# Patient Record
Sex: MALE | Employment: FULL TIME | URBAN - NONMETROPOLITAN AREA
[De-identification: names, ages, dates, MRNs, and addresses within clinical notes are randomized per-mention and may not be internally consistent; named-entity substitution may affect disease eponyms.]

---

## 2021-04-14 ENCOUNTER — OFFICE VISIT (OUTPATIENT)
Dept: FAMILY MEDICINE CLINIC | Age: 21
End: 2021-04-14
Payer: COMMERCIAL

## 2021-04-14 VITALS
WEIGHT: 174.3 LBS | HEART RATE: 98 BPM | OXYGEN SATURATION: 97 % | HEIGHT: 69 IN | DIASTOLIC BLOOD PRESSURE: 78 MMHG | SYSTOLIC BLOOD PRESSURE: 120 MMHG | BODY MASS INDEX: 25.82 KG/M2

## 2021-04-14 DIAGNOSIS — Z72.0 TOBACCO USE: ICD-10-CM

## 2021-04-14 DIAGNOSIS — L73.9 FOLLICULITIS: ICD-10-CM

## 2021-04-14 DIAGNOSIS — J45.20 MILD INTERMITTENT ASTHMA, UNSPECIFIED WHETHER COMPLICATED: Primary | ICD-10-CM

## 2021-04-14 PROCEDURE — 99203 OFFICE O/P NEW LOW 30 MIN: CPT | Performed by: FAMILY MEDICINE

## 2021-04-14 RX ORDER — AZITHROMYCIN 250 MG/1
TABLET, FILM COATED ORAL
Qty: 1 PACKET | Refills: 0 | Status: SHIPPED | OUTPATIENT
Start: 2021-04-14 | End: 2021-08-31 | Stop reason: ALTCHOICE

## 2021-04-14 RX ORDER — FLUTICASONE FUROATE AND VILANTEROL TRIFENATATE 100; 25 UG/1; UG/1
1 POWDER RESPIRATORY (INHALATION) DAILY
Qty: 60 EACH | Refills: 2 | Status: SHIPPED | OUTPATIENT
Start: 2021-04-14 | End: 2021-08-31 | Stop reason: SDUPTHER

## 2021-04-14 SDOH — ECONOMIC STABILITY: TRANSPORTATION INSECURITY
IN THE PAST 12 MONTHS, HAS LACK OF TRANSPORTATION KEPT YOU FROM MEETINGS, WORK, OR FROM GETTING THINGS NEEDED FOR DAILY LIVING?: NO

## 2021-04-14 SDOH — ECONOMIC STABILITY: FOOD INSECURITY: WITHIN THE PAST 12 MONTHS, THE FOOD YOU BOUGHT JUST DIDN'T LAST AND YOU DIDN'T HAVE MONEY TO GET MORE.: NEVER TRUE

## 2021-04-14 SDOH — ECONOMIC STABILITY: INCOME INSECURITY: HOW HARD IS IT FOR YOU TO PAY FOR THE VERY BASICS LIKE FOOD, HOUSING, MEDICAL CARE, AND HEATING?: NOT HARD AT ALL

## 2021-04-14 SDOH — ECONOMIC STABILITY: FOOD INSECURITY: WITHIN THE PAST 12 MONTHS, YOU WORRIED THAT YOUR FOOD WOULD RUN OUT BEFORE YOU GOT MONEY TO BUY MORE.: NEVER TRUE

## 2021-04-14 ASSESSMENT — ENCOUNTER SYMPTOMS
SHORTNESS OF BREATH: 1
ABDOMINAL PAIN: 0
COUGH: 0
SINUS PAIN: 0
NAUSEA: 0
VOMITING: 0

## 2021-04-14 ASSESSMENT — PATIENT HEALTH QUESTIONNAIRE - PHQ9
SUM OF ALL RESPONSES TO PHQ QUESTIONS 1-9: 2
2. FEELING DOWN, DEPRESSED OR HOPELESS: 1
SUM OF ALL RESPONSES TO PHQ QUESTIONS 1-9: 2

## 2021-04-14 NOTE — PROGRESS NOTES
[address] [address]  Dept: [phone]  Dept Fax: [phone]    Lani Llanos is a 22 y.o. male who presents today for his medical conditions/complaints as noted below. Lani Llanos c/o of Establish Care      HPI:     HPI  Pt here to establish care. Not previously seen within the health system  Rash reported since beginning job at [employer] noted today  Red dots noted with some burning and itching also  Occasional chest discomfort, anxiety and headaches- more with breathing issues    At times SOB when just sitting. More past week. Prior history asthma as child - no recent medication. Doing more physical work now, at position past months. No inhaler since 18 yo per pt in juvenile care home    Pt is daily smoker. More with stresses since 16 yo      BP Readings from Last 3 Encounters:   04/14/21 120/78          (goal 120/80)    Past Medical History:   Diagnosis Date    Asthma     Seizures ([address]       History reviewed. No pertinent surgical history. History reviewed. No pertinent family history.     Social History     Tobacco Use    Smoking status: Current Every Day Smoker     Types: Cigarettes    Smokeless tobacco: Never Used   Substance Use Topics    Alcohol use: Not on file      Prior to Visit Medications    Not on File     No Known Allergies    Health Maintenance   Topic Date Due    Hepatitis C screen  Never done    Varicella vaccine (1 of 2 - 2-dose childhood series) Never done    HPV vaccine (1 - Male 2-dose series) Never done    HIV screen  Never done    COVID-19 Vaccine (1) Never done    DTaP/Tdap/Td vaccine (1 - Tdap) Never done    Flu vaccine (Season Ended) 09/01/2021    Hepatitis A vaccine  Aged Out    Hepatitis B vaccine  Aged Out    Hib vaccine  Aged Out    Meningococcal (ACWY) vaccine  Aged Out    Pneumococcal 0-64 years Vaccine  Aged Out       Subjective:      Review of Systems   Constitutional: Negative for activity change, appetite change and fatigue. HENT: Negative for congestion and sinus pain. Eyes: Negative for visual disturbance. Respiratory: Positive for shortness of breath (when he is just sitting). Negative for cough. Cardiovascular: Positive for chest pain. Negative for palpitations and leg swelling. Gastrointestinal: Negative for abdominal pain, nausea and vomiting. Genitourinary: Negative for dysuria and frequency. Musculoskeletal: Negative for arthralgias and myalgias. Skin: Positive for rash (started at [facility] on march 23 and since that has red dots that have appeared on his arm and legs, it itches and burns. ). Neurological: Positive for headaches. Negative for dizziness. Psychiatric/Behavioral: Negative for confusion and sleep disturbance. The patient is nervous/anxious. Objective:     /78 (Site: Right Upper Arm, Position: Sitting, Cuff Size: Small Adult)   Pulse 98   Ht 5' 9.49\" (1.765 m)   Wt 174 lb 4.8 oz (79.1 kg)   SpO2 97%   BMI 25.38 kg/m²     Physical Exam  Vitals signs reviewed. Constitutional:       General: He is not in acute distress. Appearance: He is well-developed. He is not ill-appearing. HENT:      Head: Atraumatic. Eyes:      Conjunctiva/sclera: Conjunctivae normal.   Neck:      Musculoskeletal: Neck supple. Thyroid: No thyromegaly. Vascular: No carotid bruit. Cardiovascular:      Rate and Rhythm: Normal rate and regular rhythm. Heart sounds: No murmur. Pulmonary:      Effort: Pulmonary effort is normal.      Breath sounds: Normal breath sounds. Decreased air movement (throughout) present. No wheezing or rhonchi. Musculoskeletal:         General: No swelling (BLE). Right lower leg: He exhibits no swelling. Left lower leg: He exhibits no swelling. Lymphadenopathy:      Cervical: No cervical adenopathy. Neurological:      Mental Status: He is alert and oriented to person, place, and time. Psychiatric:         Judgment: Judgment normal.         Assessment:     1. Mild intermittent asthma, unspecified whether complicated    2. Folliculitis    3. Tobacco use      No results found for this visit on 04/14/21. Plan:   No orders of the defined types were placed in this encounter. Return in about 6 months (around 10/14/2021) for asthma. Patient Instructions   Stop smoking encouraged       Discussed use, benefit, and side effects of prescribed medications. All patient questions answered. Pt voiced understanding. Instructed to continue current medications, diet and exercise. Patient agreed with treatment plan. Follow up as directed.      Electronically signed by Edward Diallo MD on 4/14/2021

## 2021-04-21 ENCOUNTER — OFFICE VISIT (OUTPATIENT)
Dept: FAMILY MEDICINE CLINIC | Age: 21
End: 2021-04-21
Payer: COMMERCIAL

## 2021-04-21 VITALS
WEIGHT: 176 LBS | SYSTOLIC BLOOD PRESSURE: 102 MMHG | DIASTOLIC BLOOD PRESSURE: 70 MMHG | BODY MASS INDEX: 25.2 KG/M2 | HEART RATE: 67 BPM | HEIGHT: 70 IN | OXYGEN SATURATION: 97 %

## 2021-04-21 DIAGNOSIS — R74.8 ELEVATED CK: ICD-10-CM

## 2021-04-21 DIAGNOSIS — R06.02 SOB (SHORTNESS OF BREATH): Primary | ICD-10-CM

## 2021-04-21 DIAGNOSIS — J45.20 MILD INTERMITTENT ASTHMA, UNSPECIFIED WHETHER COMPLICATED: ICD-10-CM

## 2021-04-21 PROCEDURE — 99213 OFFICE O/P EST LOW 20 MIN: CPT | Performed by: FAMILY MEDICINE

## 2021-04-21 RX ORDER — ALBUTEROL SULFATE 90 UG/1
2 AEROSOL, METERED RESPIRATORY (INHALATION) 4 TIMES DAILY PRN
Qty: 1 INHALER | Refills: 1 | Status: SHIPPED | OUTPATIENT
Start: 2021-04-21 | End: 2022-05-09 | Stop reason: SDUPTHER

## 2021-04-21 RX ORDER — LORATADINE 10 MG/1
10 TABLET ORAL DAILY
Qty: 30 TABLET | Refills: 1 | Status: SHIPPED | OUTPATIENT
Start: 2021-04-21 | End: 2021-05-21

## 2021-04-21 ASSESSMENT — ENCOUNTER SYMPTOMS
DIARRHEA: 0
VOMITING: 0
WHEEZING: 0
SHORTNESS OF BREATH: 0
ABDOMINAL PAIN: 0

## 2021-04-21 NOTE — PROGRESS NOTES
[address] [address]  Dept: [phone]  Dept Fax: [phone]    Gonzalo Sanchez is a 22 y.o. male who presents today for his medical conditions/complaints as noted below. Gonzalo Sanchez c/o of Follow-Up from [facility] 4/16/21 SOB)      HPI:     HPI  Pt here post ER evaluation 4/16/21 had severe shortness of breath and was taken to squad from work. He had left hospital AGAINST MEDICAL ADVICE at that time. Patient reports he was at work in the [address] [place] [address] [employer] when his chest began to hurt and had more shortness of breath. He does state that when he is in that department he has had problems in the past. When he does not work at that department he has no symptoms. Patient reports the rash was also noted that he had previously gotten when he was in the same department. Patient is continuing on his antibiotic as prior. He did have allergy testing accomplished in 2016 while in foster care but was told this was all negative at that time. BP Readings from Last 3 Encounters:   04/21/21 102/70   04/14/21 120/78          (goal 120/80)    Past Medical History:   Diagnosis Date    Asthma     Seizures ([address]       No past surgical history on file. No family history on file. Social History     Tobacco Use    Smoking status: Current Every Day Smoker     Types: Cigarettes    Smokeless tobacco: Never Used   Substance Use Topics    Alcohol use: Not on file      Prior to Visit Medications    Medication Sig Taking? Authorizing Provider   fluticasone-vilanterol (BREO ELLIPTA) 100-25 MCG/INH AEPB inhaler Inhale 1 puff into the lungs daily  Nick Alberto MD   azithromycin (ZITHROMAX) 250 MG tablet Take 2 tabs (500 mg) on Day 1, and take 1 tab (250 mg) on days 2 through 5.   Nick Alberto MD     No Known Allergies    Health Maintenance   Topic Date Due    Hepatitis C screen  Never done    Varicella vaccine (1 of 2 - 2-dose childhood series) Never done    Pneumococcal 0-64 years Vaccine (1 of 1 - PPSV23) Never done    HPV vaccine (1 - Male 2-dose series) Never done    HIV screen  Never done    COVID-19 Vaccine (1) Never done    DTaP/Tdap/Td vaccine (1 - Tdap) Never done    Flu vaccine (Season Ended) 09/01/2021    Hepatitis A vaccine  Aged Out    Hepatitis B vaccine  Aged Out    Hib vaccine  Aged Out    Meningococcal (ACWY) vaccine  Aged Out       Subjective:      Review of Systems   Constitutional: Negative for fatigue and fever. Pt at [facility] on 4/16/21 for SOB, taken by squad from work. Pt left [facility] AMA. Was at work in the [address] [facility] when his chest started to hurt and had SOB. States when he is not in that department at work he doesn't have any symptoms. This is also the area he was in when he started to get the rash. Still on antibiotic. Had allergy testing done in 2016 when he was in foster care but everything was negative. Respiratory: Negative for shortness of breath and wheezing. Cardiovascular: Negative for chest pain. Gastrointestinal: Negative for abdominal pain, diarrhea and vomiting. Neurological: Negative for dizziness. Objective:     /70 (Site: Left Upper Arm, Position: Sitting, Cuff Size: Small Adult)   Pulse 67   Ht 5' 9.5\" (1.765 m)   Wt 176 lb (79.8 kg)   SpO2 97%   BMI 25.62 kg/m²     Physical Exam  Constitutional:       General: He is not in acute distress. Appearance: He is not ill-appearing or toxic-appearing. HENT:      Head: Normocephalic. Eyes:      Conjunctiva/sclera: Conjunctivae normal.   Cardiovascular:      Rate and Rhythm: Normal rate and regular rhythm. Heart sounds: No murmur. Pulmonary:      Effort: Pulmonary effort is normal.      Breath sounds: Wheezing (slight inspiratory working) present. Musculoskeletal:         General: No swelling. Skin:     Findings: No rash. Neurological:      Mental Status: He is alert.    Psychiatric: Behavior: Behavior normal.         Thought Content: Thought content normal.       ER note reviewed pt left AMA when tired of waiting for labs to return. Labs reviewed with only abnormality for elevated CK level over 400 with normal near 100       Assessment:     1. SOB (shortness of breath)    2. Elevated CK    3. Mild intermittent asthma, unspecified whether complicated      No results found for this visit on 04/21/21. Plan:     Orders Placed This Encounter   Procedures    Creatinine, Serum     Standing Status:   Future     Standing Expiration Date:   4/21/2022    CK isoenzymes     Standing Status:   Future     Standing Expiration Date:   4/21/2022    External Referral To Allergy     Referral Priority:   Routine     Referral Type:   Eval and Treat     Referral Reason:   Specialty Services Required     Requested Specialty:   Allergy     Number of Visits Requested:   1           No follow-ups on file. except with specialist      There are no Patient Instructions on file for this visit. Discussed use, benefit, and side effects of prescribed medications. All patient questions answered. Pt voiced understanding. Reviewed health maintenance. Instructed to continue current medications, diet and exercise. Patient agreed with treatment plan. Follow up as directed.      Electronically signed by Rommel Cha MD on 4/21/2021

## 2021-04-27 ENCOUNTER — TELEPHONE (OUTPATIENT)
Dept: FAMILY MEDICINE CLINIC | Age: 21
End: 2021-04-27

## 2021-08-31 ENCOUNTER — OFFICE VISIT (OUTPATIENT)
Dept: FAMILY MEDICINE CLINIC | Age: 21
End: 2021-08-31
Payer: COMMERCIAL

## 2021-08-31 VITALS
BODY MASS INDEX: 25.2 KG/M2 | HEIGHT: 70 IN | DIASTOLIC BLOOD PRESSURE: 76 MMHG | SYSTOLIC BLOOD PRESSURE: 110 MMHG | HEART RATE: 78 BPM | RESPIRATION RATE: 20 BRPM | WEIGHT: 176 LBS | OXYGEN SATURATION: 98 %

## 2021-08-31 DIAGNOSIS — R06.02 SOB (SHORTNESS OF BREATH): Primary | ICD-10-CM

## 2021-08-31 DIAGNOSIS — J45.20 MILD INTERMITTENT ASTHMA, UNSPECIFIED WHETHER COMPLICATED: ICD-10-CM

## 2021-08-31 DIAGNOSIS — Z72.0 TOBACCO USE: ICD-10-CM

## 2021-08-31 LAB
EXPIRATORY TIME: NORMAL
FEF 25-75% %PRED-PRE: NORMAL
FEF 25-75% PRED: NORMAL
FEF 25-75%-PRE: NORMAL
FEV1 %PRED-PRE: 96 %
FEV1 PRED: NORMAL
FEV1/FVC %PRED-PRE: NORMAL
FEV1/FVC PRED: NORMAL
FEV1/FVC: 104 %
FEV1: NORMAL
FVC %PRED-PRE: NORMAL
FVC PRED: NORMAL
FVC: NORMAL
PEF %PRED-PRE: NORMAL
PEF PRED: NORMAL
PEF-PRE: NORMAL

## 2021-08-31 PROCEDURE — 99213 OFFICE O/P EST LOW 20 MIN: CPT | Performed by: FAMILY MEDICINE

## 2021-08-31 PROCEDURE — 94010 BREATHING CAPACITY TEST: CPT | Performed by: FAMILY MEDICINE

## 2021-08-31 RX ORDER — FLUTICASONE FUROATE AND VILANTEROL TRIFENATATE 100; 25 UG/1; UG/1
1 POWDER RESPIRATORY (INHALATION) DAILY
Qty: 60 EACH | Refills: 2 | Status: SHIPPED | OUTPATIENT
Start: 2021-08-31 | End: 2022-05-09 | Stop reason: SDUPTHER

## 2021-08-31 ASSESSMENT — PULMONARY FUNCTION TESTS
FEV1/FVC: 104
FEV1_PERCENT_PREDICTED_PRE: 96

## 2021-08-31 ASSESSMENT — ENCOUNTER SYMPTOMS
SHORTNESS OF BREATH: 0
CHEST TIGHTNESS: 0

## 2021-08-31 NOTE — LETTER
2021 [facility] department of [address]  Phone: [phone]    Sulaiman Maki MD        August 31, 2021     Patient: Wang Harper   YOB: 2000   Date of Visit: 8/31/2021       To Whom it May Concern:    Jo Anderson was seen in my clinic on 8/31/2021. He may return to work on 8/31/21. If you have any questions or concerns, please don't hesitate to call.     Sincerely,         Sulaiman Maki MD

## 2021-08-31 NOTE — PROGRESS NOTES
[address] [address]  Dept: [phone]  Dept Fax: [phone]    Doc John is a 22 y.o. male who presents today for his medical conditions/complaints as noted below. Tanner John c/o of Annual Exam      HPI:     HPI  Pt here requesting clearance to return to work. Had couple episodes of SOB and \"passing out\" noted in March and April. Denies any issues since then. Did not keep follow up with allergist as requested at April appointment. No showed for appointment. Had incarceration prior to allergist.    Used inhaler until lost last month. No medications currently for asthma noted. 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 BP Readings from Last 3 Encounters:   08/31/21 110/76   04/21/21 102/70   04/14/21 120/78          (goal 120/80)    Past Medical History:   Diagnosis Date    Asthma     [facility] ([facility] [address] [facility]       No past surgical history on file. No family history on file. Social History     Tobacco Use    Smoking status: Current Every Day Smoker     Types: Cigarettes    Smokeless tobacco: Never Used   Substance Use Topics    Alcohol use: Not on file      Prior to Visit Medications    Medication Sig Taking? Authorizing Provider   albuterol sulfate HFA (VENTOLIN HFA) 108 (90 Base) MCG/ACT inhaler Inhale 2 puffs into the lungs 4 times daily as needed for Wheezing Yes rFanklyn Henning MD   fluticasone-vilanterol (BREO ELLIPTA) 100-25 MCG/INH AEPB inhaler Inhale 1 puff into the lungs daily Yes Franklyn Henning MD   azithromycin (ZITHROMAX) 250 MG tablet Take 2 tabs (500 mg) on Day 1, and take 1 tab (250 mg) on days 2 through 5.   Patient not taking: Reported on 8/31/2021  Franklyn Henning MD     No Known Allergies    Health Maintenance   Topic Date Due    Hepatitis C screen  Never done    Varicella vaccine (1 of 2 - 2-dose childhood series) Never done    Pneumococcal 0-64 years Vaccine (1 of 2 - PPSV23) Never done    HPV vaccine (1 - Male 2-dose series) Never done    COVID-19 Vaccine (1) Never done    HIV screen  Never done    DTaP/Tdap/Td vaccine (1 - Tdap) Never done    Flu vaccine (1) 09/01/2021    Hepatitis A vaccine  Aged Out    Hepatitis B vaccine  Aged Out    Hib vaccine  Aged Out    Meningococcal (ACWY) vaccine  Aged Out       Subjective:      Review of Systems   Constitutional: Negative for fatigue. Needs cleared to go back to work, passed out twice at work in April   Respiratory: Negative for chest tightness and shortness of breath. Cardiovascular: Negative for chest pain. Neurological: Negative for dizziness, light-headedness and headaches. Objective:     /76 (Site: Left Upper Arm, Position: Sitting, Cuff Size: Large Adult)   Pulse 78   Resp 20   Ht 5' 9.5\" (1.765 m)   Wt 176 lb (79.8 kg)   SpO2 98%   BMI 25.62 kg/m²     Physical Exam  Vitals reviewed. Constitutional:       General: He is not in acute distress. Appearance: He is well-developed. HENT:      Head: Atraumatic. Eyes:      Conjunctiva/sclera: Conjunctivae normal.   Neck:      Thyroid: No thyromegaly. Vascular: No carotid bruit. Cardiovascular:      Rate and Rhythm: Normal rate and regular rhythm. Heart sounds: No murmur heard. Pulmonary:      Effort: Pulmonary effort is normal.      Breath sounds: Normal breath sounds. Decreased air movement (throughout) present. No wheezing. Abdominal:      General: Bowel sounds are normal.      Palpations: Abdomen is soft. Musculoskeletal:         General: No swelling (BLE). Cervical back: Neck supple. Right lower leg: No swelling. Left lower leg: No swelling. Lymphadenopathy:      Cervical: No cervical adenopathy.    Neurological:      Mental Status: He is alert and oriented to person, place, and time.         Assessment:     1. SOB (shortness of breath)    2. Tobacco use    3. Mild intermittent asthma, unspecified whether complicated      No results found for this visit on 08/31/21. Plan:     Orders Placed This Encounter   Procedures    Bronchial Challenge     Standing Status:   Future     Standing Expiration Date:   8/31/2022    Spirometry Without Bronchodilator           No follow-ups on file. Patient Instructions   Encourage stop smoking  Need for methylcholine challenge to test to see if patient needs ongoing treatment for asthma to be able to return to work. Discussed use, benefit, and side effects of prescribed medications. All patient questions answered. Pt voiced understanding. Patient agreed with treatment plan. Follow up as directed.      Electronically signed by Sayra Joseph MD on 8/31/2021

## 2021-09-13 ENCOUNTER — TELEPHONE (OUTPATIENT)
Dept: FAMILY MEDICINE CLINIC | Age: 21
End: 2021-09-13

## 2021-09-13 NOTE — TELEPHONE ENCOUNTER
----- Message from [place] sent at 9/13/2021 11:34 AM EDT -----  Subject: Referral Request    QUESTIONS   Reason for referral request? Pt needs a referral to neuro. Please call him   and let him know where he can go.  dad called but told him we would have to   speak with son on where we are sending him due to dad not being on HIPPA               Can we send a neuro referral. Please advise

## 2021-09-16 NOTE — TELEPHONE ENCOUNTER
What is patient needing referral to manage? Is this regarding ADHD, dyspnea, or other issues prior evaluated.

## 2021-09-20 ENCOUNTER — TELEPHONE (OUTPATIENT)
Dept: FAMILY MEDICINE CLINIC | Age: 21
End: 2021-09-20

## 2021-09-20 NOTE — TELEPHONE ENCOUNTER
This is why [place] is saying the neurology referral is needed:      Information for Provider? Loni Zacarias called in and states that he   needs a referral he states that he was at work and he don't know if it was   the spices or the chemicals at work but he had difficulty breathing eyes   rolled in the back of his head and felt like he was having a seizure this   happened back in April 8th, please contact pt he not sure where he needs   to go  ---------------------------------------------------------------------------  --------------  [address]  What is the best way for the office to contact you? OK to leave message on   voicemail  Preferred Call Back Phone Number? [phone]  ---------------------------------------------------------------------------  --------------  SCRIPT ANSWERS  Relationship to Patient?  Self

## 2022-05-09 ENCOUNTER — OFFICE VISIT (OUTPATIENT)
Dept: FAMILY MEDICINE CLINIC | Age: 22
End: 2022-05-09
Payer: COMMERCIAL

## 2022-05-09 VITALS
DIASTOLIC BLOOD PRESSURE: 60 MMHG | SYSTOLIC BLOOD PRESSURE: 108 MMHG | OXYGEN SATURATION: 98 % | WEIGHT: 172 LBS | BODY MASS INDEX: 25.04 KG/M2 | HEART RATE: 79 BPM

## 2022-05-09 DIAGNOSIS — J45.909 MILD ASTHMA WITHOUT COMPLICATION, UNSPECIFIED WHETHER PERSISTENT: ICD-10-CM

## 2022-05-09 DIAGNOSIS — R06.02 SOB (SHORTNESS OF BREATH): ICD-10-CM

## 2022-05-09 DIAGNOSIS — R56.9 SEIZURE-LIKE ACTIVITY (HCC): Primary | ICD-10-CM

## 2022-05-09 DIAGNOSIS — J45.20 MILD INTERMITTENT ASTHMA, UNSPECIFIED WHETHER COMPLICATED: ICD-10-CM

## 2022-05-09 PROCEDURE — 99214 OFFICE O/P EST MOD 30 MIN: CPT

## 2022-05-09 RX ORDER — FLUTICASONE FUROATE AND VILANTEROL TRIFENATATE 100; 25 UG/1; UG/1
1 POWDER RESPIRATORY (INHALATION) DAILY
Qty: 60 EACH | Refills: 2 | Status: SHIPPED | OUTPATIENT
Start: 2022-05-09

## 2022-05-09 RX ORDER — ALBUTEROL SULFATE 90 UG/1
2 AEROSOL, METERED RESPIRATORY (INHALATION) 4 TIMES DAILY PRN
Qty: 1 EACH | Refills: 3 | Status: SHIPPED | OUTPATIENT
Start: 2022-05-09

## 2022-05-09 ASSESSMENT — PATIENT HEALTH QUESTIONNAIRE - PHQ9
SUM OF ALL RESPONSES TO PHQ QUESTIONS 1-9: 0
SUM OF ALL RESPONSES TO PHQ9 QUESTIONS 1 & 2: 0
SUM OF ALL RESPONSES TO PHQ QUESTIONS 1-9: 0
2. FEELING DOWN, DEPRESSED OR HOPELESS: 0
1. LITTLE INTEREST OR PLEASURE IN DOING THINGS: 0

## 2022-05-09 ASSESSMENT — ENCOUNTER SYMPTOMS
SHORTNESS OF BREATH: 0
EYE DISCHARGE: 0
COUGH: 0
COLOR CHANGE: 0
SINUS PAIN: 0
BACK PAIN: 0
SINUS PRESSURE: 0
WHEEZING: 0
RHINORRHEA: 0
NAUSEA: 0
CHEST TIGHTNESS: 0
CONSTIPATION: 0
EYE ITCHING: 0
ABDOMINAL PAIN: 0
DIARRHEA: 0

## 2022-05-09 NOTE — ASSESSMENT & PLAN NOTE
Borderline controlled, lifestyle modifications recommended and Compliance with medical regime, start Breo Ellipta 100-25 mcg and inhale. 1 puff inhaled every day, and albuterol inhaler 2 puffs inhaled 4 times a day as needed for wheezing.

## 2022-05-09 NOTE — PROGRESS NOTES
Reginaldo Jackson (:  2000) is a 25 y.o. male,Established patient, here for evaluation of the following chief complaint(s):  Shortness of Breath (patient has had issues in the past with sob and passing out at work. He has been off of work for almost a year and to return to work he has to see neurology to rule out possible seizures. )         ASSESSMENT/PLAN:  1. Seizure-like activity (HCC)  -     Rosaline Osler, MD, Neurology, [place]  2. SOB (shortness of breath)  -     albuterol sulfate HFA (VENTOLIN HFA) 108 (90 Base) MCG/ACT inhaler; Inhale 2 puffs into the lungs 4 times daily as needed for Wheezing, Disp-1 each, R-3Normal  3. Mild intermittent asthma, unspecified whether complicated  Assessment & Plan:   Borderline controlled, lifestyle modifications recommended and Compliance with medical regime, start Breo Ellipta 100-25 mcg and inhale. 1 puff inhaled every day, and albuterol inhaler 2 puffs inhaled 4 times a day as needed for wheezing. Orders:  -     albuterol sulfate HFA (VENTOLIN HFA) 108 (90 Base) MCG/ACT inhaler; Inhale 2 puffs into the lungs 4 times daily as needed for Wheezing, Disp-1 each, R-3Normal  -     fluticasone-vilanterol (BREO ELLIPTA) 100-25 MCG/INH AEPB inhaler; Inhale 1 puff into the lungs daily, Disp-60 each, R-2Normal  4. Mild asthma without complication, unspecified whether persistent  Assessment & Plan:   Borderline controlled, lifestyle modifications recommended and Compliance with medical regime, start Breo Ellipta 100-25 mcg and inhale. 1 puff inhaled every day, and albuterol inhaler 2 puffs inhaled 4 times a day as needed for wheezing. Orders:  -     albuterol sulfate HFA (VENTOLIN HFA) 108 (90 Base) MCG/ACT inhaler; Inhale 2 puffs into the lungs 4 times daily as needed for Wheezing, Disp-1 each, R-3Normal  -     fluticasone-vilanterol (BREO ELLIPTA) 100-25 MCG/INH AEPB inhaler;  Inhale 1 puff into the lungs daily, Disp-60 each, R-2Normal      Return in about 6 months (around 11/9/2022). Subjective   SUBJECTIVE/OBJECTIVE:  Martha Kim is here today to establish with PCP, and to have a referral to neurology. He was a patient of Dr. Spring Monday, all past notes, H&P's, and lab results are reviewed today at this appointment. He was seen by Dr. Spring Monday years ago for \"seizure-like activity\". He was at work at [employer] when he inhaled spices and \"eyes rolled back into his head and passed out\". Today he has no neurological symptoms, he does tell me that he had 1 other episode like this in the past.  This was a year ago, and he has not had one since. He has not been to work since, and in order to return he tells me he needs to see neurology. Neurology referral discussed, and placed at this time. He has a history of asthma, and shortness of breath intermittently with this. He tells me he has not used his inhalers, and that his asthma is not well controlled. Use of inhalers as discussed, to use albuterol inhaler every 4 hours 2 puffs as needed for wheezing. And to use Breo Ellipta inhaler 1 puff every day. Side effects of these medications discussed, as well as the need to be compliant in order to help control his asthma symptoms. Martha Kim tells me he understands this, and will pick these medications up at the pharmacy today and start them. Review of Systems   Constitutional: Negative for chills, fatigue, fever and unexpected weight change. HENT: Negative for congestion, ear pain, rhinorrhea, sinus pressure and sinus pain. Eyes: Negative for discharge, itching and visual disturbance. Respiratory: Negative for cough, chest tightness, shortness of breath and wheezing. Cardiovascular: Negative for chest pain, palpitations and leg swelling. Gastrointestinal: Negative for abdominal pain, constipation, diarrhea and nausea. Endocrine: Negative for cold intolerance, heat intolerance, polydipsia and polyphagia.    Genitourinary: Negative for dysuria, flank pain and frequency. Musculoskeletal: Negative for arthralgias, back pain and myalgias. Skin: Negative for color change. Allergic/Immunologic: Negative for environmental allergies and food allergies. Neurological: Negative for dizziness, weakness, light-headedness, numbness and headaches. Psychiatric/Behavioral: Negative for agitation, behavioral problems, confusion, self-injury and suicidal ideas. The patient is not nervous/anxious. Objective   Physical Exam  Vitals and nursing note reviewed. Constitutional:       General: He is not in acute distress. Appearance: Normal appearance. He is not ill-appearing. HENT:      Head: Normocephalic and atraumatic. Right Ear: Tympanic membrane and external ear normal.      Left Ear: Tympanic membrane and external ear normal.      Nose: Nose normal. No congestion or rhinorrhea. Mouth/Throat:      Mouth: Mucous membranes are moist.      Pharynx: Oropharynx is clear. No posterior oropharyngeal erythema. Eyes:      Pupils: Pupils are equal, round, and reactive to light. Cardiovascular:      Rate and Rhythm: Normal rate and regular rhythm. Pulses: Normal pulses. Heart sounds: Normal heart sounds. Pulmonary:      Effort: Pulmonary effort is normal.      Breath sounds: Normal breath sounds. No wheezing or rhonchi. Abdominal:      General: Bowel sounds are normal.      Palpations: There is no mass. Tenderness: There is no abdominal tenderness. Musculoskeletal:         General: No swelling or tenderness. Normal range of motion. Cervical back: Normal range of motion. No rigidity or tenderness. Skin:     General: Skin is warm and dry. Capillary Refill: Capillary refill takes less than 2 seconds. Coloration: Skin is not pale. Findings: No erythema. Neurological:      General: No focal deficit present. Mental Status: He is alert and oriented to person, place, and time. Cranial Nerves:  No cranial nerve deficit. Motor: No weakness. Gait: Gait normal.   Psychiatric:         Mood and Affect: Mood normal.         Behavior: Behavior normal.         Thought Content: Thought content normal.         Judgment: Judgment normal.                  An electronic signature was used to authenticate this note.     --GUILLE Genao - CNP

## 2022-06-20 ENCOUNTER — OFFICE VISIT (OUTPATIENT)
Dept: NEUROLOGY | Age: 22
End: 2022-06-20
Payer: COMMERCIAL

## 2022-06-20 VITALS
WEIGHT: 184.8 LBS | DIASTOLIC BLOOD PRESSURE: 64 MMHG | OXYGEN SATURATION: 100 % | HEIGHT: 72 IN | SYSTOLIC BLOOD PRESSURE: 110 MMHG | BODY MASS INDEX: 25.03 KG/M2 | HEART RATE: 66 BPM

## 2022-06-20 DIAGNOSIS — R55 NEAR SYNCOPE: ICD-10-CM

## 2022-06-20 DIAGNOSIS — F17.200 SMOKER: ICD-10-CM

## 2022-06-20 DIAGNOSIS — F84.0 AUTISM: ICD-10-CM

## 2022-06-20 DIAGNOSIS — F31.9 BIPOLAR 1 DISORDER (HCC): ICD-10-CM

## 2022-06-20 DIAGNOSIS — R41.3 MEMORY LOSS: ICD-10-CM

## 2022-06-20 DIAGNOSIS — F17.290 VAPING NICOTINE DEPENDENCE, TOBACCO PRODUCT: ICD-10-CM

## 2022-06-20 DIAGNOSIS — F90.0 ATTENTION DEFICIT HYPERACTIVITY DISORDER (ADHD), PREDOMINANTLY INATTENTIVE TYPE: ICD-10-CM

## 2022-06-20 DIAGNOSIS — F41.9 ANXIETY: ICD-10-CM

## 2022-06-20 DIAGNOSIS — Z82.0 FAMILY HISTORY OF SEIZURE DISORDER: ICD-10-CM

## 2022-06-20 DIAGNOSIS — J45.20 MILD INTERMITTENT ASTHMA WITHOUT COMPLICATION: ICD-10-CM

## 2022-06-20 DIAGNOSIS — R56.9 SEIZURE-LIKE ACTIVITY (HCC): Primary | ICD-10-CM

## 2022-06-20 PROCEDURE — 99204 OFFICE O/P NEW MOD 45 MIN: CPT | Performed by: PSYCHIATRY & NEUROLOGY

## 2022-06-20 ASSESSMENT — ENCOUNTER SYMPTOMS
COLOR CHANGE: 0
APNEA: 0
EYE PAIN: 0
CONSTIPATION: 0
TROUBLE SWALLOWING: 0
CHEST TIGHTNESS: 0
PHOTOPHOBIA: 0
EYE DISCHARGE: 0
EYE ITCHING: 0
BACK PAIN: 0
VOICE CHANGE: 0
SINUS PRESSURE: 0
WHEEZING: 0
ABDOMINAL DISTENTION: 0
BLOOD IN STOOL: 0
DIARRHEA: 0
FACIAL SWELLING: 0
VISUAL CHANGE: 0
CHOKING: 0
BOWEL INCONTINENCE: 0
SHORTNESS OF BREATH: 0
EYE REDNESS: 0

## 2022-06-20 NOTE — PROGRESS NOTES
[facility]  Neurology    [address] [address]  PJOFW:[phone]   Fax: [phone]        SUBJECTIVE:       PATIENT ID:  Reginaldo Jackson is a  RIGHT    HANDED 25 y.o. male. Neurologic Problem  The patient's primary symptoms include memory loss and near-syncope. The patient's pertinent negatives include no altered mental status, clumsiness, focal sensory loss, focal weakness, slurred speech, syncope, visual change or weakness. Primary symptoms comment: H/O   STARING   EPISODE,  EYES  ROLL  UP,   FOLLOWED  BY   MEMORY PROBLEMS  LASTING  FOR   5   MINUTES  . This is a new problem. The neurological problem developed suddenly. The problem has been resolved since onset. Pertinent negatives include no back pain, bladder incontinence, bowel incontinence, confusion, dizziness, headaches, light-headedness, palpitations or shortness of breath. Past treatments include nothing. The treatment provided significant relief. His past medical history is significant for mood changes. There is no history of a bleeding disorder, a clotting disorder, a CVA, dementia, head trauma, liver disease or seizures. History obtained from  The   [address]       and other  available   medical  records   were  Also  reviewed. The  Duration,  Quality,  Severity,  Location,  Timing,  Context,  Modifying  Factors   Of   The   Chief   Complaint       And  Present  Illness  Was   Reviewed   In   Chronological   Manner. PATIENT'S  MAIN  CONCERNS INCLUDE :                       1)    H/O    STARING  LIKE   EPISODE     WITH    EYE  ROLL  UP                               ON      04/09/2021      AT   WORK   WHILE  STANDING                                  PATIENT  SLUMPED  TO   FLOOR     LASTING  FOR    5   MINUTES .                                       PATIENT   HAD    MEMORY   LOSS    AT THAT   TIME    SAME. IT  WAS  HOT    ENVIRONMENT      AT   FACTORY                                      AT   THAT    TIME      AS  PER    PATIENT'S   FATHER                                   D /   D     INCLUDE                                           PARTIAL  COMPLEX   SEIZURE                                          VASOVAGAL,   CARDIAC    AND  OTHER  ETIOLOGIES                                                      2)      PATIENT   WAS    TAKEN     TO      ER     AT                                     [facility]    AT   THAT  TIME  . PATIENT  WAS   OBSERVED                                      AND  SENT   HOME. 3)       NO   PREVIOUS    H/O   SIMILAR  EPISODES. NORMAL   BIRTH    AND  DEVELOPMENT                                 NO  PREVIOUS    H/O    HEAD  INJURIES                               4)     FAMILY     HISTORY OF   SEIZURE  DISORDER                                       -    SISTER                              5)      CHILD العراقي  HISTORY  OF     ADHAD,   AUTISM,                                 BIPOLAR  DISORDER                                  HAD     EVALUATIONS      BY   MENTAL  HEALTH                                        PROFESSIONALS       IN  CHILD  العراقي                             6)     H/O   ANXIETY. PATIENT  DENIES    ANY                                    DEPRESSION                           7)     H/O     CHRONIC  SMOKING                    PATIENT  AWARE  OF  RISKS  AND                 SIDE  EFFECTS  OF   SMOKING   DISCUSSED. PATIENT   ADVISED   AND  COUNSELED    TO   QUIT  SMOKING. 8)       H/O    TOBACCO    VAPING. NO     H/O  ALCOHOL   USAGE                         NO    H/O   ILLEGAL    SUBSTANCE  USAGE                                                      9)    H/O    MILD     ASTHMA. NO     H/O   CARDIAC  PROBLEMS. 10)         AVAILABLE   PREVIOUS   MEDICAL  RECORDS      AND                                  REPORTS     REVIEWED    IN   DETAIL                                  ON    06/20/2022                                              11)   IN  VIEW  OF  THE  PATIENT'S    MULTIPLE   NEUROLOGICAL                           SYMPTOMS  AND  CONCERNS    FOR  PROLONGED   DURATION,                           AND    MULTIPLE   CO MORBID  MEDICAL  CONDITIONS,                           PATIENT    NEEDS  NEURO  DIAGNOSTIC  EVALUATIONS                      FOR   ANY   NEUROLOGICAL  PATHOLOGIES    AND  OTHER                        CORRECTABLE   ETIOLOGIES;     AND                              PATIENT  REQUESTS   THE  SAME. 12)     VARIOUS  RISK   FACTORS   WERE  REVIEWED   AND   DISCUSSED. PATIENT   HAS  MULTIPLE   MEDICAL, NEUROLOGICAL                        AND   MENTAL HEALTH   PROBLEMS . PATIENT'S   MANAGEMENT  IS  CHALLENGING.                                         PRECIPITATING  FACTORS: including  fever/infection, exertion/relaxation, position change, stress,                weather change,   medications/alcohol, time of day/darkness/light  Are  absent                                                          MODIFYING  FACTORS:  fever/infection, exertion/relaxation, position change, stress, weather change,               medications/alcohol, time of day/darkness/light  Are  absent                Patient   Indicates   The  Presence   And  The  Absence  Of  The  Following    Associated  And             Additional  Neurological    Symptoms:                                Balance  And coordination   problems  absent           Gait problems     absent            Headaches      absent              Migraines           absent           Memory problemsabsent              Confusion        absent Paresthesia   numbness          absent           Seizures  And  Starring  Episodes           present           Syncope,  Near  syncopal episodes         absent           Speech   problems           absent             Swallowing   Problems      absent            Dizziness,  Light headedness           absent              Vertigo        absent             Generalized   Weakness    absent              focal  Weakness     absent             Tremors         absent              Sleep  Problems     absent             History  Of   Recent  Head  Injury     absent             History  Of   Recent  TIA     absent             History  Of   Recent    Stroke     absent             Neck  Pain   and   Neck muscle  Spasms  absent               Radiating  down   And   Weakness           absent            Lower back   Pain  And     Spasms  absent              Radiating    Down   And   Weakness          absent                H/OFALLS        absent               History  Of   Visual  Symptoms    absent                  Associated   Diplopia       absent                                               Also   Additional   Symptoms   Present    As  Documented    In   The   detailed                  Review  Of  Systems   And    Please   Refer   To    Them for   Additional    Information. Any components  That are either  Unobtainable  Or  Limited  In   HPI, ROS  And/or PFSH   Are                   Due   ToPatient's  Medical  Problems,  Clinical  Condition   and/or lack of                                 other    Alternate   resources.             RECORDS   REVIEWED:    historical medical records           INFORMATION   REVIEWED:     MEDICAL   HISTORY,SURGICAL   HISTORY,     MEDICATIONS   LIST,   ALLERGIES AND  DRUG  INTOLERANCES,       FAMILY   HISTORY,  SOCIAL  HISTORY,      PROBLEM  LIST   FOR  PATIENT  CARE   COORDINATION          Past Medical History:   Diagnosis Date    Asthma     Seizures ([facility] [address]          History reviewed. No pertinent surgical history. Current Outpatient Medications   Medication Sig Dispense Refill    albuterol sulfate HFA (VENTOLIN HFA) 108 (90 Base) MCG/ACT inhaler Inhale 2 puffs into the lungs 4 times daily as needed for Wheezing 1 each 3    fluticasone-vilanterol (BREO ELLIPTA) 100-25 MCG/INH AEPB inhaler Inhale 1 puff into the lungs daily 60 each 2     No current facility-administered medications for this visit. No Known Allergies      History reviewed. No pertinent family history. Social History     Socioeconomic History    Marital status: Single     Spouse name: Not on file    Number of children: Not on file    Years of education: Not on file    Highest education level: Not on file   Occupational History    Not on file   Tobacco Use    Smoking status: Current Every Day Smoker     Packs/day: 0.50     Types: Cigarettes    Smokeless tobacco: Never Used   Vaping Use    Vaping Use: Every day    Substances: Flavoring    Devices: Disposable   Substance and Sexual Activity    Alcohol use: Never    Drug use: Never    Sexual activity: Not on file   Other Topics Concern    Not on file   Social History Narrative    Not on file     Social Determinants of Health     Financial Resource Strain:     Difficulty of Paying Living Expenses: Not on file   Food Insecurity:     Worried About Running Out of Food in the Last Year: Not on file    Lalo of Food in the Last Year: Not on file   Transportation Needs:     Lack of Transportation (Medical): Not on file    Lack of Transportation (Non-Medical):  Not on file   Physical Activity:     Days of Exercise per Week: Not on file    Minutes of Exercise per Session: Not on file   Stress:     Feeling of Stress : Not on file   Social Connections:     Frequency of Communication with Friends and Family: Not on file    Frequency of Social Gatherings with Friends and Family: Not on file    Attends Cheondoism Services: Not on file   Tyler Active Member of Clubs or Organizations: Not on file    Attends Club or Organization Meetings: Not on file    Marital Status: Not on file   Intimate Partner Violence:     Fear of Current or Ex-Partner: Not on file    Emotionally Abused: Not on file    Physically Abused: Not on file    Sexually Abused: Not on file   Housing Stability:     Unable to Pay for Housing in the Last Year: Not on file    Number of [place] in the Last Year: Not on file    Unstable Housing in the Last Year: Not on file       Vitals:    06/20/22 0949   BP: 110/64   Pulse: 66   SpO2: 100%         Wt Readings from Last 3 Encounters:   06/20/22 184 lb 12.8 oz (83.8 kg)   05/09/22 172 lb (78 kg)   08/31/21 176 lb (79.8 kg)         BP Readings from Last 3 Encounters:   06/20/22 110/64   05/09/22 108/60   08/31/21 110/76         Review of Systems   Constitutional: Negative for appetite change and unexpected weight change. HENT: Negative for dental problem, drooling, ear discharge, ear pain, facial swelling, hearing loss, mouth sores, nosebleeds, postnasal drip, sinus pressure, tinnitus, trouble swallowing and voice change. Eyes: Negative for photophobia, pain, discharge, redness, itching and visual disturbance. Respiratory: Negative for apnea, choking, chest tightness, shortness of breath and wheezing. Cardiovascular: Positive for near-syncope. Negative for palpitations and leg swelling. Gastrointestinal: Negative for abdominal distention, blood in stool, bowel incontinence, constipation and diarrhea. Endocrine: Negative for cold intolerance, heat intolerance, polydipsia, polyphagia and polyuria. Genitourinary: Negative for bladder incontinence. Musculoskeletal: Negative for back pain, gait problem and neck stiffness. Skin: Negative for color change, pallor and wound. Allergic/Immunologic: Negative for environmental allergies, food allergies and immunocompromised state.    Neurological: Negative for dizziness, tremors, focal weakness, syncope, facial asymmetry, speech difficulty, weakness, light-headedness and headaches. Hematological: Negative for adenopathy. Does not bruise/bleed easily. Psychiatric/Behavioral: Positive for decreased concentration and memory loss. Negative for agitation, behavioral problems, confusion, dysphoric mood, hallucinations, self-injury, sleep disturbance and suicidal ideas. The patient is nervous/anxious. The patient is not hyperactive. OBJECTIVE:      Physical Exam  Constitutional:       Appearance: He is well-developed. HENT:      Head: Normocephalic and atraumatic. No raccoon eyes or Lang's sign. Right Ear: External ear normal.      Left Ear: External ear normal.      Nose: Nose normal.   Eyes:      Conjunctiva/sclera: Conjunctivae normal.      Pupils: Pupils are equal, round, and reactive to light. Neck:      Thyroid: No thyroid mass or thyromegaly. Vascular: No carotid bruit. Trachea: No tracheal deviation. Meningeal: Brudzinski's sign and Kernig's sign absent. Cardiovascular:      Rate and Rhythm: Normal rate and regular rhythm. Pulmonary:      Effort: Pulmonary effort is normal.   Musculoskeletal:         General: No tenderness. Normal range of motion. Cervical back: Normal range of motion and neck supple. No rigidity. No muscular tenderness. Normal range of motion. Skin:     General: Skin is warm. Coloration: Skin is not pale. Findings: No erythema or rash. Nails: There is no clubbing. Psychiatric:         Attention and Perception: He is inattentive. Mood and Affect: Mood is anxious. Mood is not depressed. Affect is not labile, blunt or inappropriate. Speech: He is communicative. Speech is not rapid and pressured, delayed, slurred or tangential.         Behavior: Behavior is not agitated, slowed, aggressive, withdrawn, hyperactive or combative. Behavior is cooperative. Thought Content:  Thought content is not paranoid or delusional. Thought content does not include homicidal or suicidal ideation. Thought content does not include homicidal or suicidal plan. Cognition and Memory: Memory is not impaired. He does not exhibit impaired recent memory or impaired remote memory. Judgment: Judgment is not impulsive or inappropriate. NEUROLOGICALEXAMINATION :      A) MENTAL STATUS:                   Alert and  oriented  To time, place  And  Person. No Aphasia. No  Dysarthria. Able   To  Follow     SIMPLE    commands   without   Any  Difficulty. No right  To left confusion. Normal  Speech  And language function. Insight and  Judgment ,Fund  Of  Knowledge   within normal limits                Recent  And  Remote memory  within   normal limits                Attention &  Concentration are within   normal limits                                                   B) CRANIAL NERVES :        CN : Visual  Acuity  And  Visual fields  within normal limits               Fundi  Could  Not  Be  Could  Not  Be  Evaluated. 3,4,6 CN : Both  Pupils are   Reactive and  Equal.  Movements  Are  Intact. No  Nystagmus. No  CECELIA. No  Afferent  Pupillary  Defect noted. 5 CN :  Normal  Facial sensations and Corneal  Reflexes           7 CN:  Normal  Facial  Symmetry  And  Strength. No facial  Weakness. 8 CN :  Hearing  Appears within normal limits          9, 10 CN: Normal   spontaneous, reflex   palate   movements         11 CN:   Normal  Shoulder  shrug and  strength         12 CN :   Normal  Tongue movements and  Tongue  In midline                        No tongue   Fasciculations or atrophy       C) MOTOR  EXAM:                 Strength  In upper  And  Lower   extremities   within   normal limits               No  Drift.      No  Atrophy               Rapid   alternating  And repetitions  Movements  within   normal limits                 Muscle  Tone  In upper  And  Lower  Extremities  normal                No rigidity. No  Spasticity. Bradykinesia   absent                 No  Asterixis. Sustention  Tremor , Resting   Tremor   absent                    No   other  Abnormal  Movements noted           D) SENSORY :               Light   touch, pinprick,   position  And  Vibration  within normal limits        E) REFLEXES:                   Deep  Tendon  Reflexes   normal                  No  pathological  Reflexes  Bilaterally. F) COORDINATION  AND  GAIT :                                Station and  Gait  normal                              Romberg 's test negative                          Ataxia negative      ASSESSMENT:      Patient Active Problem List   Diagnosis    Asthma    [facility] [address]    Near syncope    Memory loss    Attention deficit hyperactivity disorder (ADHD), predominantly inattentive type    Bipolar 1 disorder ([facility] [address]    Autism    Family history of seizure disorder    Anxiety           VISITING DIAGNOSIS:      ICD-10-CM    1. Seizure-like activity (HCC)  R56.9 CBC     Comprehensive Metabolic Panel     TSH     Vitamin B12 & Folate     Urine Drug Screen     EEG     MRI BRAIN WO CONTRAST     XR CERVICAL SPINE (4-5 VIEWS)     XR CHEST STANDARD (2 VW)   2. Mild intermittent asthma without complication  X68.64    3. Near syncope  R55 CBC     Comprehensive Metabolic Panel     TSH     Vitamin B12 & Folate     Urine Drug Screen     EEG     MRI BRAIN WO CONTRAST     XR CERVICAL SPINE (4-5 VIEWS)     XR CHEST STANDARD (2 VW)   4. Memory loss  R41.3 CBC     Comprehensive Metabolic Panel     TSH     Vitamin B12 & Folate     Urine Drug Screen     EEG     MRI BRAIN WO CONTRAST     XR CERVICAL SPINE (4-5 VIEWS)     XR CHEST STANDARD (2 VW)   5.  Attention deficit hyperactivity disorder (ADHD), predominantly inattentive type  F90.0    6. Bipolar 1 disorder (HCC)  F31.9    7. Autism  F84.0    8. Family history of seizure disorder  Z82.0 CBC     Comprehensive Metabolic Panel     TSH     Vitamin B12 & Folate     Urine Drug Screen     EEG     MRI BRAIN WO CONTRAST     XR CERVICAL SPINE (4-5 VIEWS)     XR CHEST STANDARD (2 VW)   9. Anxiety  F41.9                 CONCERNS   &   INCREASED   RISK   FOR          *  SEIZURE  ACTIVITY,  EPILEPSY ,                 VARIOUS  RISK   FACTORS   WERE  REVIEWED   AND   DISCUSSED. *  PATIENT   HAS  MULTIPLE   MEDICAL, NEUROLOGICAL                        AND   MENTAL HEALTH   PROBLEMS            PATIENT'S   MANAGEMENT  IS  CHALLENGING. PLAN:                         Creta Prime  Of  The  Diagnoses,  The  Management & Treatment  Options            AND    Care  plan  Were          Reviewed and   Discussed   With  patient. * Goals  And  Expectations  Of  The  Therapy  Discussed   And  Reviewed. *   Benefits   And   Side  Effect  Profile  Of  Medication/s   Were   Discussed                * Need   For  Further   Follow up For  The  Various  Problems Were  discussed. * Results  Of  The  Previous  Diagnostic tests were reviewed and  discussed                   Medical  Decision  Making  Was  Made  Based on the   Complexity  Of  Above  Mentioned  Diagnoses,    Data reviewed             And    Risk  Of  Significant   Co morbidities and   complicating   Factors. Medical  Decision  Was    Complexity   Due   To  The  Patient's  Multiple  Symptoms  &  Disease,            Complex  Treatment  Regimen,  Multiple medications           and   Risk  Of   Side  Effects,  Difficulty  In  Medication  Management  And  Diagnostic  Challenges           In  View  Of  The  Associated   Co  Morbid  Conditions   And  Problems.                          *   ABSOLUTELY   NO  DRIVING    UNTIL  CLEARED      *   BE  CAREFUL  WITH  ACTIVITIES           *   ADEQUATE FLUID  INTAKE   AND  ELECTROLYTE  BALANCE           * KEEP  DAIRY  OF   THE  NEUROLOGICAL  SYMPTOMS        RECORDING THE    DURATION  AND  FREQUENCY. *  AVOID    CONDITIONS  AND  FACTORS   THAT  MAKE                  NEUROLOGICAL  SYMPTOMS  WORSE. *   SEIZURE  PRECAUTIONS. A)  Avoid  Working  At   [employer] B)  Avoid  Working  With  Heavy machinery. C)  Avoid   Swimming,  Climbing  A  Ladder   Unattended. D)  Avoid   Driving   If  You   Have  A  Seizure. E)  Must   Be  Seizure  Free   For  At   Least   6 months,  Before   You  Can drive. F) Some times  Your  May  Feel  Seizure coming  Before  It  Begins. You  May feelsmell or funny  Feeling  In  Your  Stomach,  Which is  Called   Aura. *TO  MAINTAIN  REGULAR  SLEEP  WAKE  CYCLES. *   TO  HAVE  ADEQUATE  REST  AND   SLEEP    HOURS.                *    AVOID  ANY USAGE OF    TOBACCO,          AVOID  EXCESSIVE  ALCOHOL  AND   ILLEGAL   SUBSTANCES      *   Compliance   With  Medications   And  Instructions          *  MEDICATIONS TO AVOID:    Willnaomi Joyce          *    Prophylactic  Use   Of     Vitamin   B   Complex,  Folic  Acid,    Vitamin  B12    Multivitamin,       Calcium  With  magnesium  And  Vit D    Supplementations   Over  The  Counter  Discussed       .         *  PATIENT  IS  ALSO   COUNSELED   TO  KEEP    ACTIVITIES:         A)   SIMPLE      B)  ORGANIZED      C)  WRITEDOWN                     *  EVALUATIONS  AND  FOLLOW UP:                           * EPILEPSY  MONITORING    AS  NEEDED                                                  *   IN  VIEW  OF  THE  PATIENT'S    MULTIPLE   NEUROLOGICAL                           SYMPTOMS  AND  CONCERNS    FOR  PROLONGED   DURATION,                           AND    MULTIPLE   CO MORBID  MEDICAL  CONDITIONS,                           PATIENT    NEEDS  NEURO  DIAGNOSTIC  EVALUATIONS FOR   ANY   NEUROLOGICAL  PATHOLOGIES    AND  OTHER                        CORRECTABLE   ETIOLOGIES;     AND                              PATIENT  REQUESTS   THE  SAME. Orders Placed This Encounter   Procedures    MRI BRAIN WO CONTRAST    XR CERVICAL SPINE (4-5 VIEWS)    XR CHEST STANDARD (2 VW)    CBC    Comprehensive Metabolic Panel    TSH    Vitamin B12 & Folate    Urine Drug Screen    EEG                                            *  PATIENT  TO  RETURN  THE  CLINIC  AFTER   ABOVE,                       FOR   FURTHER    RE EVALUATIONS                               AND  ADDITIONAL  RECOMMENDATIONS            *PATIENT   TO  FOLLOW  UP  WITH   PRIMARY  CARE         OTHER  CONSULTANTS  AS  BEFORE.               *TO  FOLLOW  WITH   MENTAL  HEALTH  PROFESSIONALS ,  INCLUDING            PSYCHOLOGICAL  COUNSELING   AND  PSYCHIATRIC  EVALUTIONS,                     *  Maintain   Healthy  Life Style    With   Periodic  Monitoring  Of          Any  Medical  Conditions  Including   Elevated  Blood  Pressure,  Lipid  Profile,        Blood  Sugar levels  AndHeart  Disease. *   Period   Screening  For  Cancers  Involving  Breast,  Colon,         Lungs  And  Other  Organs  As  Applicable,  In consultation   With  Your  Primary Care Providers. *Second  Neurological  Opinion  And  Evaluations  In  [facility] AND [facility]  Setting  If  Patient  Is  Interested. * Please   Contact   Neurology  Clinic   Early   If   Are  Any  New  Neurological   And  Any neurological  Concerns.                    *  If  The  Patient remains  Neurologically  Stable   Return   To  [facility] Neurology Department   IN      1-2       MONTHS  TIME   FOR  FURTHER              FOLLOW UP.                       *   The  Neurological   Findings,  Possible  Diagnosis,  Differential diagnoses                    And  Options  For    Further   Investigations And  management   Are  Discussed  Comprehensively. Medications   And  Prescription   Risks  And  Side effects  Are   Also  Discussed. *  If   There is  Any  Significant  Worsening   Of  Current  Symptoms  And  Or                  If patient  Develops   Any additional  New  NeurologicalSymptoms                  Or  Significant  Concerns   Should  Call  911 or  Go  To  Emergency  Department                  For  Further  Immediate  Evaluation and  management . The   Above  Were  Reviewed  With  PATIENT   and   HIS  FATHER                          questions  Answered  In  Detail. TOTAL   TIME     SPENT :               On this date 6/20/2022 I have spent  60  minutes    reviewing previous notes, test results               and face to face time with the patient including     discussing the diagnosis and importance of compliance               with the treatment plan  as well as documenting on the day of the visit. Electronically signed by   Chris Nick M.D., Kymberly Mix. Board Certified in  Neurology &  In  [facility] [facility] and Neurology ([employer])      DISCLAIMER:   Although every effort was made to ensure the accuracy of this  electronictranscription, some errors in transcription may have occurred. GENERAL PATIENT INSTRUCTIONS:      A Healthy Lifestyle: Care Instructions   Your Care Instructions   A healthy lifestyle can help you feel good, stay at ahealthy weight, and have plenty of energy for both work and play. A healthy lifestyle is something you can share with your whole family.  A healthy lifestyle also can lower your risk for serious health problems, such ashigh blood pressure, heart disease, and diabetes.    You can follow a few steps listed below to improve your health and the health of your family.  Follow-up careis a key part of your treatment and safety. Be sure to make and go to all appointments, and call your doctor if you are having problems. Its also a good idea to know your test results and keep a list of the medicines you take.  How can you care for yourself at home?  Do not eat too much sugar, fat, or fast foods. You can still have dessert and treats nowand then. The goal is moderation.  Start small to improve your eating habits. Pay attention to portion sizes, drink less juice and soda pop, and eat more fruits and vegetables.  Eat a healthy amount of food. A 3-ounce serving of meat, for example, is about the size of a deck of cards. Fill the rest of your plate with vegetables and whole grains.  Limit theamount of soda and sports drinks you have every day. Drink more water when you are thirsty.  Eat at least 5 servings of fruits and vegetables every day. It may seem like a lot, but it is not hard to reach this goal. Aserving or helping is 1 piece of fruit, 1 cup of vegetables, or 2 cups of leafy, raw vegetables. Have an apple or some carrot sticks as an afternoon snack instead of a candy bar. Try to have fruits and/or vegetables at everymeal.   Make exercise part of your daily routine. You may want to start with simple activities, such as walking, bicycling, or slow swimming. Try jeffery active 30 to 60 minutes every day. You do not need to do all 30 to 60 minutes all at once. For example, you can exercise 3 times a day for 10 or 20 minutes. Moderate exercise is safe for most people, but it is always agood idea to talk to your doctor before starting an exercise program.   Keep moving. Fayrene Hernandez the lawn, work in the garden, or Spot[employer]. Take the stairs instead of the elevator at work.  If you smoke, quit. Peoplewho smoke have an increased risk for heart attack, stroke, cancer, and other lung illnesses.  Quitting is hard, but there are ways to boost your chance of quitting tobacco for good.  Use nicotine gum, patches, or lozenges.  Ask your doctor about stop-smoking programs and medicines.  Keep trying.  In addition to reducing your risk of diseases in the future, you will notice some benefits soon after you stop using tobacco. If you have shortness of breath or asthma symptoms, they will likely getbetter within a few weeks after you quit.  Limit how much alcohol you drink. Moderate amounts of alcohol (up to 2 drinks a day for men, 1drink a day for women) are okay. But drinking too much can lead to liver problems, high blood pressure, and other health problems.  health   If you have a family, there are many things you can do together to improve your health.  Eat meals together as a family as often as possible.  Eat healthy foods. This includes fruits, vegetables, lean meats and dairy, and whole grains.  Include your family in your fitness plan. Most peoplethink of activities such as jogging or tennis as the way to fitness, but there are many ways you and your family can be more active. Anything that makes you breathe hard and gets your heart pumping is exercise. Here are sometips:   Walk to do errands or to take your child to school or the bus.  Go for a family bike ride after dinner instead of watching TV.  Where can you learn more?  Go toFedCybertps://[employer]Get-n-Post.ICEdot. org and sign in to your [employer] account. Enter N282 in the Search HealthInformation box to learn more about \"A Healthy Lifestyle: Care Instructions. \"     If you do not have anaccount, please click on the \"Sign Up Now\" link.  Current as of: July 26, 2016   Content Version: 11.2   © 4144-7623 [employer]. Care instructions adapted under license by [facility] ([facility]). If you have questions about a medical condition or this instruction, always ask your healthcare professional. [employer] disclaims any warranty or liability for your use of this information.

## 2022-06-27 ENCOUNTER — HOSPITAL ENCOUNTER (OUTPATIENT)
Dept: MRI IMAGING | Age: 22
Discharge: HOME | End: 2022-06-29
Payer: COMMERCIAL

## 2022-06-27 ENCOUNTER — HOSPITAL ENCOUNTER (OUTPATIENT)
Dept: NEUROLOGY | Age: 22
Discharge: HOME | End: 2022-06-27
Payer: COMMERCIAL

## 2022-06-27 DIAGNOSIS — Z82.0 FAMILY HISTORY OF SEIZURE DISORDER: ICD-10-CM

## 2022-06-27 DIAGNOSIS — R41.3 MEMORY LOSS: ICD-10-CM

## 2022-06-27 DIAGNOSIS — R56.9 SEIZURE-LIKE ACTIVITY (HCC): ICD-10-CM

## 2022-06-27 DIAGNOSIS — R55 NEAR SYNCOPE: ICD-10-CM

## 2022-06-27 PROCEDURE — 95957 EEG DIGITAL ANALYSIS: CPT

## 2022-06-27 PROCEDURE — 95813 EEG EXTND MNTR 61-119 MIN: CPT

## 2022-06-27 PROCEDURE — 70551 MRI BRAIN STEM W/O DYE: CPT

## 2022-06-27 NOTE — PROGRESS NOTES
EXTENDED EEG Completed with [facility]. PCP: Renetta Norris, APRN - [facility]    Ordering: Magdaleno Mckeon.  Danny Neurologist    Interpreting Physician: Nuha Saxena Neurologist    Technician: Katelynn Johnson RN

## 2022-06-29 ENCOUNTER — OFFICE VISIT (OUTPATIENT)
Dept: NEUROLOGY | Age: 22
End: 2022-06-29
Payer: COMMERCIAL

## 2022-06-29 VITALS
DIASTOLIC BLOOD PRESSURE: 64 MMHG | HEART RATE: 66 BPM | WEIGHT: 181 LBS | HEIGHT: 72 IN | BODY MASS INDEX: 24.52 KG/M2 | SYSTOLIC BLOOD PRESSURE: 110 MMHG

## 2022-06-29 DIAGNOSIS — R55 NEAR SYNCOPE: ICD-10-CM

## 2022-06-29 DIAGNOSIS — F84.0 AUTISM: ICD-10-CM

## 2022-06-29 DIAGNOSIS — Z82.0 FAMILY HISTORY OF SEIZURE DISORDER: ICD-10-CM

## 2022-06-29 DIAGNOSIS — F90.0 ATTENTION DEFICIT HYPERACTIVITY DISORDER (ADHD), PREDOMINANTLY INATTENTIVE TYPE: ICD-10-CM

## 2022-06-29 DIAGNOSIS — R41.3 MEMORY LOSS: ICD-10-CM

## 2022-06-29 DIAGNOSIS — F41.9 ANXIETY: ICD-10-CM

## 2022-06-29 DIAGNOSIS — R56.9 SEIZURE-LIKE ACTIVITY (HCC): Primary | ICD-10-CM

## 2022-06-29 DIAGNOSIS — F31.9 BIPOLAR 1 DISORDER (HCC): ICD-10-CM

## 2022-06-29 PROCEDURE — 99214 OFFICE O/P EST MOD 30 MIN: CPT | Performed by: PSYCHIATRY & NEUROLOGY

## 2022-06-29 ASSESSMENT — ENCOUNTER SYMPTOMS
ABDOMINAL DISTENTION: 0
CONSTIPATION: 0
PHOTOPHOBIA: 0
DIARRHEA: 0
FACIAL SWELLING: 0
EYE ITCHING: 0
APNEA: 0
CHEST TIGHTNESS: 0
WHEEZING: 0
EYE PAIN: 0
VOICE CHANGE: 0
BACK PAIN: 0
COLOR CHANGE: 0
TROUBLE SWALLOWING: 0
BOWEL INCONTINENCE: 0
EYE REDNESS: 0
SHORTNESS OF BREATH: 0
BLOOD IN STOOL: 0
EYE DISCHARGE: 0
SINUS PRESSURE: 0
VISUAL CHANGE: 0
CHOKING: 0

## 2022-06-29 NOTE — PROCEDURES
Cris [address]                 [address] [place] [id number]                           ELECTROENCEPHALOGRAM (EEG) REPORT        PATIENT NAME: Guerline Sebastian                  :        2000  MED REC NO:   [id number]                             ROOM:  ACCOUNT NO:   [de-identified]                           ADMIT DATE: 2022      PROVIDER:     Mony Swanson MD    DATE OF STUDY:  2022      PRIMARY CARE PROVIDER: GUILLE Coronado CNP      TECHNIQUE:  23 channels of EEG, 2 channels of EOG, 2 channel of EKG and  1 channel ground and 1 channel reference were recorded with [employer]  Software 32 Channel System utilizing the International 10/20 System  Protocol. Rudi detection and seizure analysis protocols were utilized and the  study was reviewed using the comprehensive EEG monitoring. This is an extended EEG recorded for 1 hour and 2 minutes. CLINICAL DATA:        The patient is 25years old with a history of  seizure-like activity, near syncope, memory loss. The patient has  history of ADHD, bipolar disorder, autism, family history of seizure  disorder, anxiety. The purpose of the study is to evaluate for seizure activity. BACKGROUND ACTIVITY:      While the patient was awake, the background  activity consisted of fairly-regulated 8 to 9 Hz waveform seen over both  cerebral hemispheres. ACTIVATION PROCEDURES:      HYPERVENTILATION:  Hyperventilation was performed for 3 minutes. Patient  was cooperative with good effort. Also, post-hyperventilation period  was monitored closely. Hyperventilation:  Unremarkable. PHOTIC STIMULATION:  Photic stimulation was performed at the following  frequencies: 1 Hz, 3 Hz, 6 Hz, 9 Hz, 12 Hz, 15 Hz, 18 Hz, 21 Hz, 25 Hz,  30 Hz and patient tolerated well. Photic stimulation:  Mild bilateral symmetric driving response noted. SLEEP:  The patient is drowsy.     EKG:  EKG showed normal sinus rhythm without any significant  abnormality. IMPRESSION:       No significant focal, lateralized or epileptiform features    noted during the current recording. Further clinical correlation and followup recommended. Preeti Jimenez MD, [address] [place] [address]     Board Certified in Neurology  & in  [id number] [facility] [address] [facility] ([facility]).            D: 06/29/2022 12:18:27       T: 06/29/2022 13:06:00     [id number]  Job#: [id number]     Doc#: [id number]    CC:    GUILLE Juarez - CNP

## 2022-06-29 NOTE — PROGRESS NOTES
THAT   TIME    SAME. IT  WAS  HOT    ENVIRONMENT      AT   FACTORY                                      AT   THAT    TIME      AS  PER    PATIENT'S   FATHER                                   D /   D     INCLUDE                                           PARTIAL  COMPLEX   SEIZURE                                          VASOVAGAL,   CARDIAC    AND  OTHER  ETIOLOGIES                                                      2)      PATIENT   WAS    TAKEN     TO      ER     AT                                     [facility]    AT   THAT  TIME  . PATIENT  WAS   OBSERVED                                      AND  SENT   HOME. 3)       NO   PREVIOUS    H/O   SIMILAR  EPISODES. NORMAL   BIRTH    AND  DEVELOPMENT                                 NO  PREVIOUS    H/O    HEAD  INJURIES                               4)     FAMILY     HISTORY OF   SEIZURE  DISORDER                                       -    SISTER                              5)      CHILD العراقي  HISTORY  OF     ADHAD,   AUTISM,                                 BIPOLAR  DISORDER                                  HAD     EVALUATIONS      BY   MENTAL  HEALTH                                        PROFESSIONALS       IN  CHILD  العراقي                             6)     H/O   ANXIETY. PATIENT  DENIES    ANY                                    DEPRESSION                           7)     H/O     CHRONIC  SMOKING                    PATIENT  AWARE  OF  RISKS  AND                 SIDE  EFFECTS  OF   SMOKING   DISCUSSED. PATIENT   ADVISED   AND  COUNSELED    TO   QUIT  SMOKING. 8)       H/O    TOBACCO    VAPING. NO     H/O  ALCOHOL   USAGE                         NO    H/O   ILLEGAL    SUBSTANCE  USAGE                                                      9)    H/O    MILD     ASTHMA. NO     H/O   CARDIAC  PROBLEMS. 10)         AVAILABLE   PREVIOUS   MEDICAL  RECORDS      AND                                  REPORTS     REVIEWED    IN   DETAIL                                  ON    06/20/2022                                              11)                       PATIENT    HAD   NEURO  DIAGNOSTIC  EVALUATIONS  IN   June 2022                                 MRI  BRAIN    AND  EEG     SHOWED     NO  SIGNIFICANT                                       ABNORMALITIES. PATIENT  AND  HIS  FATHER  DENIES      ANY   DEFINITE                                         SEIZURE   ACTIVITY. NO    RECURRENCE   OF   NEAR  SYNCOPE     OR                                        STARING  EPISODES                                -   REVIEWED    WITH PATIENT  AND  HIS  FATHER                        12)       RECOMMENDED  :                                   A)   ADEQUATE     REST  AND  SLEEP                                B)    ADEQUATE  INTAKE   OF  FLUIDS  AND  ELECTROLYTES                                 C)     BE  CAREFUL   WITH  HIS  ACTIVITIES                                 D)     MONITOR    FOR     ANY  RECURRENCE  OF  SIMILAR  EPISODES                                 E)     FOLLOW  UP   WITH     PCP     AND  CARDIOLOGY                                                 13)     VARIOUS  RISK   FACTORS   WERE  REVIEWED   AND   DISCUSSED. PATIENT   HAS  MULTIPLE   MEDICAL, NEUROLOGICAL                        AND   MENTAL HEALTH   PROBLEMS . PATIENT'S   MANAGEMENT  IS  CHALLENGING.                                         PRECIPITATING  FACTORS: including  fever/infection, exertion/relaxation, position change, stress,                weather change,   medications/alcohol, time of day/darkness/light  Are  absent                                                          MODIFYING FACTORS:  fever/infection, exertion/relaxation, position change, stress, weather change,               medications/alcohol, time of day/darkness/light  Are  absent                Patient   Indicates   The  Presence   And  The  Absence  Of  The  Following    Associated  And             Additional  Neurological    Symptoms:                                Balance  And coordination   problems  absent           Gait problems     absent            Headaches      absent              Migraines           absent           Memory problemsabsent              Confusion        absent            Paresthesia   numbness          absent           Seizures  And  Starring  Episodes           present           Syncope,  Near  syncopal episodes         absent           Speech   problems           absent             Swallowing   Problems      absent            Dizziness,  Light headedness           absent              Vertigo        absent             Generalized   Weakness    absent              focal  Weakness     absent             Tremors         absent              Sleep  Problems     absent             History  Of   Recent  Head  Injury     absent             History  Of   Recent  TIA     absent             History  Of   Recent    Stroke     absent             Neck  Pain   and   Neck muscle  Spasms  absent               Radiating  down   And   Weakness           absent            Lower back   Pain  And     Spasms  absent              Radiating    Down   And   Weakness          absent                H/OFALLS        absent               History  Of   Visual  Symptoms    absent                  Associated   Diplopia       absent                                               Also   Additional   Symptoms   Present    As  Documented    In   The   detailed                  Review  Of  Systems   And    Please   Refer   To    Them for   Additional    Information.                     Any components  That are either  Unobtainable  Or  Limited In   HPI, ROS  And/or PFSH   Are                   Due   ToPatient's  Medical  Problems,  Clinical  Condition   and/or lack of                                 other    Alternate   resources. RECORDS   REVIEWED:    historical medical records           INFORMATION   REVIEWED:     MEDICAL   HISTORY,SURGICAL   HISTORY,     MEDICATIONS   LIST,   ALLERGIES AND  DRUG  INTOLERANCES,       FAMILY   HISTORY,  SOCIAL  HISTORY,      PROBLEM  LIST   FOR  PATIENT  CARE   COORDINATION          Past Medical History:   Diagnosis Date    Asthma     Seizures ([facility] [address]          History reviewed. No pertinent surgical history. Current Outpatient Medications   Medication Sig Dispense Refill    albuterol sulfate HFA (VENTOLIN HFA) 108 (90 Base) MCG/ACT inhaler Inhale 2 puffs into the lungs 4 times daily as needed for Wheezing 1 each 3    fluticasone-vilanterol (BREO ELLIPTA) 100-25 MCG/INH AEPB inhaler Inhale 1 puff into the lungs daily 60 each 2     No current facility-administered medications for this visit. No Known Allergies      History reviewed. No pertinent family history.       Social History     Socioeconomic History    Marital status: Single     Spouse name: Not on file    Number of children: Not on file    Years of education: Not on file    Highest education level: Not on file   Occupational History    Not on file   Tobacco Use    Smoking status: Current Every Day Smoker     Packs/day: 0.50     Types: Cigarettes    Smokeless tobacco: Never Used   Vaping Use    Vaping Use: Every day    Substances: Flavoring    Devices: Disposable   Substance and Sexual Activity    Alcohol use: Never    Drug use: Never    Sexual activity: Not on file   Other Topics Concern    Not on file   Social History Narrative    Not on file     Social Determinants of Health     Financial Resource Strain:     Difficulty of Paying Living Expenses: Not on file   Food Insecurity:     Worried About [facility] in the Last Year: Not on file    Ran Out of Food in the Last Year: Not on file   Transportation Needs:     Lack of Transportation (Medical): Not on file    Lack of Transportation (Non-Medical): Not on file   Physical Activity:     Days of Exercise per Week: Not on file    Minutes of Exercise per Session: Not on file   Stress:     Feeling of Stress : Not on file   Social Connections:     Frequency of Communication with Friends and Family: Not on file    Frequency of Social Gatherings with Friends and Family: Not on file    Attends [employer] Services: Not on file    Active Member of [address] [place] [address] [employer] or Organizations: Not on file    Attends Club or Organization Meetings: Not on file    Marital Status: Not on file   Intimate Partner Violence:     Fear of Current or Ex-Partner: Not on file    Emotionally Abused: Not on file    Physically Abused: Not on file    Sexually Abused: Not on file   Housing Stability:     Unable to Pay for Housing in the Last Year: Not on file    Number of [place] in the Last Year: Not on file    Unstable Housing in the Last Year: Not on file       Vitals:    06/29/22 1107   BP: 110/64   Pulse: 66         Wt Readings from Last 3 Encounters:   06/29/22 181 lb (82.1 kg)   06/20/22 184 lb 12.8 oz (83.8 kg)   05/09/22 172 lb (78 kg)         BP Readings from Last 3 Encounters:   06/29/22 110/64   06/20/22 110/64   05/09/22 108/60         Review of Systems   Constitutional: Negative for appetite change and unexpected weight change. HENT: Negative for dental problem, drooling, ear discharge, ear pain, facial swelling, hearing loss, mouth sores, nosebleeds, postnasal drip, sinus pressure, tinnitus, trouble swallowing and voice change. Eyes: Negative for photophobia, pain, discharge, redness, itching and visual disturbance. Respiratory: Negative for apnea, choking, chest tightness, shortness of breath and wheezing. Cardiovascular: Positive for near-syncope.  Negative for palpitations and leg swelling. Gastrointestinal: Negative for abdominal distention, blood in stool, bowel incontinence, constipation and diarrhea. Endocrine: Negative for cold intolerance, heat intolerance, polydipsia, polyphagia and polyuria. Genitourinary: Negative for bladder incontinence. Musculoskeletal: Negative for back pain, gait problem and neck stiffness. Skin: Negative for color change, pallor and wound. Allergic/Immunologic: Negative for environmental allergies, food allergies and immunocompromised state. Neurological: Negative for dizziness, tremors, focal weakness, syncope, facial asymmetry, speech difficulty, weakness, light-headedness and headaches. Hematological: Negative for adenopathy. Does not bruise/bleed easily. Psychiatric/Behavioral: Positive for decreased concentration and memory loss. Negative for agitation, behavioral problems, confusion, dysphoric mood, hallucinations, self-injury, sleep disturbance and suicidal ideas. The patient is nervous/anxious. The patient is not hyperactive. OBJECTIVE:      Physical Exam  Constitutional:       Appearance: He is well-developed. HENT:      Head: Normocephalic and atraumatic. No raccoon eyes or Lang's sign. Right Ear: External ear normal.      Left Ear: External ear normal.      Nose: Nose normal.   Eyes:      Conjunctiva/sclera: Conjunctivae normal.      Pupils: Pupils are equal, round, and reactive to light. Neck:      Thyroid: No thyroid mass or thyromegaly. Vascular: No carotid bruit. Trachea: No tracheal deviation. Meningeal: Brudzinski's sign and Kernig's sign absent. Cardiovascular:      Rate and Rhythm: Normal rate and regular rhythm. Pulmonary:      Effort: Pulmonary effort is normal.   Musculoskeletal:         General: No tenderness. Normal range of motion. Cervical back: Normal range of motion and neck supple. No rigidity. No muscular tenderness. Normal range of motion.    Skin:     General: Skin is warm. Coloration: Skin is not pale. Findings: No erythema or rash. Nails: There is no clubbing. Psychiatric:         Attention and Perception: He is inattentive. Mood and Affect: Mood is anxious. Mood is not depressed. Affect is not labile, blunt or inappropriate. Speech: He is communicative. Speech is not rapid and pressured, delayed, slurred or tangential.         Behavior: Behavior is not agitated, slowed, aggressive, withdrawn, hyperactive or combative. Behavior is cooperative. Thought Content: Thought content is not paranoid or delusional. Thought content does not include homicidal or suicidal ideation. Thought content does not include homicidal or suicidal plan. Cognition and Memory: Memory is not impaired. He does not exhibit impaired recent memory or impaired remote memory. Judgment: Judgment is not impulsive or inappropriate. NEUROLOGICALEXAMINATION :      A) MENTAL STATUS:                   Alert and  oriented  To time, place  And  Person. No Aphasia. No  Dysarthria. Able   To  Follow     SIMPLE    commands   without   Any  Difficulty. No right  To left confusion. Normal  Speech  And language function. Insight and  Judgment ,Fund  Of  Knowledge   within normal limits                Recent  And  Remote memory  within   normal limits                Attention &  Concentration are within   normal limits                                                   B) CRANIAL NERVES :        CN : Visual  Acuity  And  Visual fields  within normal limits               Fundi  Could  Not  Be  Could  Not  Be  Evaluated. 3,4,6 CN : Both  Pupils are   Reactive and  Equal.  Movements  Are  Intact. No  Nystagmus. No  CECELIA. No  Afferent  Pupillary  Defect noted.           5 CN :  Normal  Facial sensations and Corneal  Reflexes 7 CN:  Normal  Facial  Symmetry  And  Strength. No facial  Weakness. 8 CN :  Hearing  Appears within normal limits          9, 10 CN: Normal   spontaneous, reflex   palate   movements         11 CN:   Normal  Shoulder  shrug and  strength         12 CN :   Normal  Tongue movements and  Tongue  In midline                        No tongue   Fasciculations or atrophy       C) MOTOR  EXAM:                 Strength  In upper  And  Lower   extremities   within   normal limits               No  Drift. No  Atrophy               Rapid   alternating  And  repetitions  Movements  within   normal limits                 Muscle  Tone  In upper  And  Lower  Extremities  normal                No rigidity. No  Spasticity. Bradykinesia   absent                 No  Asterixis. Sustention  Tremor , Resting   Tremor   absent                    No   other  Abnormal  Movements noted           D) SENSORY :               Light   touch, pinprick,   position  And  Vibration  within normal limits        E) REFLEXES:                   Deep  Tendon  Reflexes   normal                  No  pathological  Reflexes  Bilaterally.                                   F) COORDINATION  AND  GAIT :                                Station and  Gait  normal                              Romberg 's test negative                          Ataxia negative      ASSESSMENT:      Patient Active Problem List   Diagnosis    Asthma    Seizure-like activity ([address]    Near syncope    Memory loss    Attention deficit hyperactivity disorder (ADHD), predominantly inattentive type    Bipolar 1 disorder ([address]    Autism    Family history of seizure disorder    Anxiety       MRI OF THE BRAIN WITHOUT CONTRAST  6/27/2022 11:30 am       TECHNIQUE:   Multiplanar multisequence MRI of the brain was performed without the   administration of intravenous contrast.       COMPARISON:   None.       HISTORY:   ORDERING SYSTEM PROVIDED HISTORY: Seizure-like activity [facility])       FINDINGS:   INTRACRANIAL STRUCTURES/VENTRICLES: The sellar and suprasellar structures,   optic chiasm, corpus callosum, pineal gland, tectum, and midline brainstem   structures are unremarkable.  The craniocervical junction is unremarkable. There is no acute hemorrhage, mass effect, or midline shift. Seth Fail is   satisfactory overall gray-white matter differentiation.  The ventricular   structures are symmetric and unremarkable.  The infratentorial structures   including the cerebellopontine angles and internal auditory canals are   unremarkable.  There is no abnormal restricted diffusion.  There is no   abnormal blooming artifact on susceptibility weighted imaging.       ORBITS: The visualized portion of the orbits demonstrate no acute abnormality.       SINUSES: There is mild chronic sinusitis of the paranasal sinuses.  The   mastoid air cells are normally aerated.       BONES/SOFT TISSUES: The bone marrow signal intensity appears normal. The soft   tissues demonstrate no acute abnormality.           Impression   No acute intracranial abnormality.       Mild chronic sinusitis. VISITING DIAGNOSIS:      ICD-10-CM    1. Seizure-like activity ([address]  R56.9    2. Bipolar 1 disorder (HCC)  F31.9    3. Near syncope  R55    4. Autism  F84.0    5. Memory loss  R41.3    6. Family history of seizure disorder  Z82.0    7. Attention deficit hyperactivity disorder (ADHD), predominantly inattentive type  F90.0    8. Anxiety  F41.9                 CONCERNS   &   INCREASED   RISK   FOR          *  SEIZURE  ACTIVITY,  EPILEPSY ,                 VARIOUS  RISK   FACTORS   WERE  REVIEWED   AND   DISCUSSED. *  PATIENT   HAS  MULTIPLE   MEDICAL, NEUROLOGICAL                        AND   MENTAL HEALTH   PROBLEMS            PATIENT'S   MANAGEMENT  IS  CHALLENGING.             PLAN:                         Daphne Lindsay  Of  The  Diagnoses,  The  Management & Treatment  Options AND    Care  plan  Were          Reviewed and   Discussed   With  patient. * Goals  And  Expectations  Of  The  Therapy  Discussed   And  Reviewed. *   Benefits   And   Side  Effect  Profile  Of  Medication/s   Were   Discussed                * Need   For  Further   Follow up For  The  Various  Problems Were  discussed. * Results  Of  The  Previous  Diagnostic tests were reviewed and  discussed                   Medical  Decision  Making  Was  Made  Based on the   Complexity  Of  Above  Mentioned  Diagnoses,    Data reviewed             And    Risk  Of  Significant   Co morbidities and   complicating   Factors. Medical  Decision  Was    Complexity   Due   To  The  Patient's  Multiple  Symptoms  &  Disease,            Complex  Treatment  Regimen,  Multiple medications           and   Risk  Of   Side  Effects,  Difficulty  In  Medication  Management  And  Diagnostic  Challenges           In  View  Of  The  Associated   Co  Morbid  Conditions   And  Problems. *   ABSOLUTELY   NO  DRIVING    UNTIL  CLEARED      *   BE  CAREFUL  WITH  ACTIVITIES           *   ADEQUATE   FLUID  INTAKE   AND  ELECTROLYTE  BALANCE           * KEEP  DAIRY  OF   THE  NEUROLOGICAL  SYMPTOMS        RECORDING THE    DURATION  AND  FREQUENCY. *  AVOID    CONDITIONS  AND  FACTORS   THAT  MAKE                  NEUROLOGICAL  SYMPTOMS  WORSE. *   SEIZURE  PRECAUTIONS. A)  Avoid  Working  At   [employer] B)  Avoid  Working  With  Heavy machinery. C)  Avoid   Swimming,  Climbing  A  Ladder   Unattended. D)  Avoid   Driving   If  You   Have  A  Seizure. E)  Must   Be  Seizure  Free   For  At   Least   6 months,  Before   You  Can drive. F) Some times  Your  May  Feel  Seizure coming  Before  It  Begins. You  May feelsmell or funny  Feeling  In  Your  Stomach,  Which is  Called   Aura. *TO  MAINTAIN  REGULAR  SLEEP  WAKE  CYCLES. *   TO  HAVE  ADEQUATE  REST  AND   SLEEP    HOURS.                *    AVOID  ANY USAGE OF    TOBACCO,          AVOID  EXCESSIVE  ALCOHOL  AND   ILLEGAL   SUBSTANCES      *   Compliance   With  Medications   And  Instructions          *  MEDICATIONS TO AVOID:    Marcheta [place]          *    Prophylactic  Use   Of     Vitamin   B   Complex,  Folic  Acid,    Vitamin  B12    Multivitamin,       Calcium  With  magnesium  And  Vit D    Supplementations   Over  The  Counter  Discussed       . *  PATIENT  IS  ALSO   COUNSELED   TO  KEEP    ACTIVITIES:         A)   SIMPLE      B)  ORGANIZED      C)  WRITEDOWN                     *  EVALUATIONS  AND  FOLLOW UP:                           * EPILEPSY  MONITORING    AS  NEEDED                                            *        RECOMMENDED  :                                   A)   ADEQUATE     REST  AND  SLEEP                                B)    ADEQUATE  INTAKE   OF  FLUIDS  AND  ELECTROLYTES                                 C)     BE  CAREFUL   WITH  HIS  ACTIVITIES                                 D)     MONITOR    FOR     ANY  RECURRENCE  OF  SIMILAR  EPISODES                                 E)     FOLLOW  UP   WITH     PCP     AND  CARDIOLOGY                                   *PATIENT   TO  FOLLOW  UP  WITH   PRIMARY  CARE         OTHER  CONSULTANTS  AS  BEFORE.               *TO  FOLLOW  WITH   MENTAL  HEALTH  PROFESSIONALS ,  INCLUDING            PSYCHOLOGICAL  COUNSELING   AND  PSYCHIATRIC  EVALUTIONS,                     *  Maintain   Healthy  Life Style    With   Periodic  Monitoring  Of          Any  Medical  Conditions  Including   Elevated  Blood  Pressure,  Lipid  Profile,        Blood  Sugar levels  AndHeart  Disease. *   Period   Screening  For  Cancers  Involving  Breast,  Colon,         Lungs  And  Other  Organs  As  Applicable,  In consultation   With  Your  Primary Care Providers. *Second  Neurological  Opinion  And  Evaluations  In  [facility]  Setting  If  Patient  Is  Interested. * Please   Contact   Neurology  Clinic   Early   If   Are  Any  New  Neurological   And  Any neurological  Concerns. *  If  The  Patient remains  Neurologically  Stable   Return   To  [facility] Neurology Department   IN      3 - 6        MONTHS  TIME   FOR  FURTHER              FOLLOW UP.                       *   The  Neurological   Findings,  Possible  Diagnosis,  Differential diagnoses                    And  Options  For    Further   Investigations                   And  management   Are  Discussed  Comprehensively. Medications   And  Prescription   Risks  And  Side effects  Are   Also  Discussed. *  If   There is  Any  Significant  Worsening   Of  Current  Symptoms  And  Or                  If patient  Develops   Any additional  New  NeurologicalSymptoms                  Or  Significant  Concerns   Should  Call  911 or  Go  To  Emergency  Department                  For  Further  Immediate  Evaluation and  management . The   Above  Were  Reviewed  With  PATIENT   and   HIS  FATHER                          questions  Answered  In  Detail. Electronically signed by   John Rowan M.D., Tamara Queen. Board Certified in  Neurology &  In  [facility] [employer] [facility] and [facility] ([employer])      DISCLAIMER:   Although every effort was made to ensure the accuracy of this  electronictranscription, some errors in transcription may have occurred. GENERAL PATIENT INSTRUCTIONS:      A Healthy Lifestyle: Care Instructions   Your Care Instructions   A healthy lifestyle can help you feel good, stay at ahealthy weight, and have plenty of energy for both work and play.  A healthy lifestyle is something you can share with your whole family.  A healthy lifestyle also can lower your risk for serious health problems, such ashigh blood pressure, heart disease, and diabetes.  You can follow a few steps listed below to improve your health and the health of your family.  Follow-up careis a key part of your treatment and safety. Be sure to make and go to all appointments, and call your doctor if you are having problems. Its also a good idea to know your test results and keep a list of the medicines you take.  How can you care for yourself at home?  Do not eat too much sugar, fat, or fast foods. You can still have dessert and treats nowand then. The goal is moderation.  Start small to improve your eating habits. Pay attention to portion sizes, drink less juice and soda pop, and eat more fruits and vegetables.  Eat a healthy amount of food. A 3-ounce serving of meat, for example, is about the size of a deck of cards. Fill the rest of your plate with vegetables and whole grains.  Limit theamount of soda and sports drinks you have every day. Drink more water when you are thirsty.  Eat at least 5 servings of fruits and vegetables every day. It may seem like a lot, but it is not hard to reach this goal. Aserving or helping is 1 piece of fruit, 1 cup of vegetables, or 2 cups of leafy, raw vegetables. Have an apple or some carrot sticks as an afternoon snack instead of a candy bar. Try to have fruits and/or vegetables at everymeal.   Make exercise part of your daily routine. You may want to start with simple activities, such as walking, bicycling, or slow swimming. Try jeffery active 30 to 60 minutes every day. You do not need to do all 30 to 60 minutes all at once. For example, you can exercise 3 times a day for 10 or 20 minutes. Moderate exercise is safe for most people, but it is always agood idea to talk to your doctor before starting an exercise program.   Keep moving.  Mow the lawn, work in the garden, or clean your house. Take the stairs instead of the elevator at work.  If you smoke, quit. Peoplewho smoke have an increased risk for heart attack, stroke, cancer, and other lung illnesses. Quitting is hard, but there are ways to boost your chance of quitting tobacco for good.  Use nicotine gum, patches, or lozenges.  Ask your doctor about stop-smoking programs and medicines.  Keep trying.  In addition to reducing your risk of diseases in the future, you will notice some benefits soon after you stop using tobacco. If you have shortness of breath or asthma symptoms, they will likely getbetter within a few weeks after you quit.  Limit how much alcohol you drink. Moderate amounts of alcohol (up to 2 drinks a day for men, 1drink a day for women) are okay. But drinking too much can lead to liver problems, high blood pressure, and other health problems.  health   If you have a family, there are many things you can do together to improve your health.  Eat meals together as a family as often as possible.  Eat healthy foods. This includes fruits, vegetables, lean meats and dairy, and whole grains.  Include your family in your fitness plan. Most peoplethink of activities such as jogging or tennis as the way to fitness, but there are many ways you and your family can be more active. Anything that makes you breathe hard and gets your heart pumping is exercise. Here are sometips:   Walk to do errands or to take your child to school or the bus.  Go for a family bike ride after dinner instead of watching TV.  Where can you learn more?  Go [email or web address] and sign in to your [email or web address] account. Enter Z617 in the Search HealthInformation box to learn more about \"A Healthy Lifestyle: Care Instructions. \"     If you do not have anaccount, please click on the \"Sign Up Now\" link.    Current as of: July 26, 2016   Content Version: 11.2   © 6105-0820 [employer]. Care instructions adapted under license by [facility]. If you have questions about a medical condition or this instruction, always ask your healthcare professional. [employer] disclaims any warranty or liability for your use of this information.